# Patient Record
Sex: FEMALE | Race: WHITE | URBAN - METROPOLITAN AREA
[De-identification: names, ages, dates, MRNs, and addresses within clinical notes are randomized per-mention and may not be internally consistent; named-entity substitution may affect disease eponyms.]

---

## 2017-05-05 ENCOUNTER — RECORDS - HEALTHEAST (OUTPATIENT)
Dept: LAB | Facility: CLINIC | Age: 42
End: 2017-05-05

## 2017-05-05 LAB
CHOLEST SERPL-MCNC: 137 MG/DL
FASTING STATUS PATIENT QL REPORTED: ABNORMAL
HDLC SERPL-MCNC: 45 MG/DL
LDLC SERPL CALC-MCNC: 81 MG/DL
TRIGL SERPL-MCNC: 53 MG/DL

## 2018-04-11 ENCOUNTER — TRANSFERRED RECORDS (OUTPATIENT)
Dept: HEALTH INFORMATION MANAGEMENT | Facility: CLINIC | Age: 43
End: 2018-04-11

## 2018-10-08 ENCOUNTER — APPOINTMENT (OUTPATIENT)
Dept: LAB | Facility: CLINIC | Age: 43
End: 2018-10-08
Attending: OBSTETRICS & GYNECOLOGY
Payer: COMMERCIAL

## 2018-10-08 ENCOUNTER — TRANSFERRED RECORDS (OUTPATIENT)
Dept: HEALTH INFORMATION MANAGEMENT | Facility: CLINIC | Age: 43
End: 2018-10-08

## 2018-10-17 ENCOUNTER — MEDICAL CORRESPONDENCE (OUTPATIENT)
Dept: HEALTH INFORMATION MANAGEMENT | Facility: CLINIC | Age: 43
End: 2018-10-17

## 2018-11-02 NOTE — TELEPHONE ENCOUNTER
Date of appointment: 11/09/18   Diagnosis/reason for appointment:Endometrial Hyperplasia  Referring provider/facility:[facility]  Who called:    Recent Studies  Imaging:  Pathology:  Labs:  Previous chemo/radiation (if known):    Records requested from:  Records received from:    Additional information:Getting Images and Path

## 2018-11-06 NOTE — TELEPHONE ENCOUNTER
Fax from [facility] has been sent to HIM for scanning.  Faxed request for slides from [facility] to be sent overnight.  Getting Imaging from [facility].

## 2018-11-08 ASSESSMENT — ENCOUNTER SYMPTOMS
DYSURIA: 0
FLANK PAIN: 0
DIFFICULTY URINATING: 1
DECREASED LIBIDO: 1
HOT FLASHES: 0

## 2018-11-08 NOTE — TELEPHONE ENCOUNTER
I called [facility] on 11/07/18 and left a VM for a call back to obtain images on a mutual patient.    I called today, 11/08/18, and left another  for them to call me as soon as possible for a patient that they see and we are seeing tomorrow, 11/09/18. I left my name and my call back number.

## 2018-11-09 ENCOUNTER — ONCOLOGY VISIT (OUTPATIENT)
Dept: ONCOLOGY | Facility: CLINIC | Age: 43
End: 2018-11-09
Attending: OBSTETRICS & GYNECOLOGY
Payer: COMMERCIAL

## 2018-11-09 ENCOUNTER — APPOINTMENT (OUTPATIENT)
Dept: LAB | Facility: CLINIC | Age: 43
End: 2018-11-09
Attending: OBSTETRICS & GYNECOLOGY
Payer: COMMERCIAL

## 2018-11-09 ENCOUNTER — PRE VISIT (OUTPATIENT)
Dept: ONCOLOGY | Facility: CLINIC | Age: 43
End: 2018-11-09

## 2018-11-09 VITALS
OXYGEN SATURATION: 100 % | SYSTOLIC BLOOD PRESSURE: 130 MMHG | HEART RATE: 82 BPM | RESPIRATION RATE: 16 BRPM | BODY MASS INDEX: 21.79 KG/M2 | WEIGHT: 127.65 LBS | TEMPERATURE: 98.3 F | HEIGHT: 64 IN | DIASTOLIC BLOOD PRESSURE: 77 MMHG

## 2018-11-09 DIAGNOSIS — Z01.818 PRE-OP EXAM: Primary | ICD-10-CM

## 2018-11-09 DIAGNOSIS — Z01.818 PRE-OP EXAM: ICD-10-CM

## 2018-11-09 DIAGNOSIS — N85.02 COMPLEX ATYPICAL ENDOMETRIAL HYPERPLASIA: ICD-10-CM

## 2018-11-09 LAB
ALBUMIN SERPL-MCNC: 4.1 G/DL (ref 3.4–5)
ALP SERPL-CCNC: 41 U/L (ref 40–150)
ALT SERPL W P-5'-P-CCNC: 18 U/L (ref 0–50)
ANION GAP SERPL CALCULATED.3IONS-SCNC: 8 MMOL/L (ref 3–14)
AST SERPL W P-5'-P-CCNC: 15 U/L (ref 0–45)
BASOPHILS # BLD AUTO: 0 10E9/L (ref 0–0.2)
BASOPHILS NFR BLD AUTO: 0.7 %
BILIRUB SERPL-MCNC: 0.4 MG/DL (ref 0.2–1.3)
BUN SERPL-MCNC: 11 MG/DL (ref 7–30)
CALCIUM SERPL-MCNC: 8.6 MG/DL (ref 8.5–10.1)
CHLORIDE SERPL-SCNC: 110 MMOL/L (ref 94–109)
CO2 SERPL-SCNC: 22 MMOL/L (ref 20–32)
CREAT SERPL-MCNC: 0.78 MG/DL (ref 0.52–1.04)
DIFFERENTIAL METHOD BLD: ABNORMAL
EOSINOPHIL # BLD AUTO: 0 10E9/L (ref 0–0.7)
EOSINOPHIL NFR BLD AUTO: 0.7 %
ERYTHROCYTE [DISTWIDTH] IN BLOOD BY AUTOMATED COUNT: 14.2 % (ref 10–15)
GFR SERPL CREATININE-BSD FRML MDRD: 81 ML/MIN/1.7M2
GLUCOSE SERPL-MCNC: 82 MG/DL (ref 70–99)
HCT VFR BLD AUTO: 37.3 % (ref 35–47)
HGB BLD-MCNC: 11.2 G/DL (ref 11.7–15.7)
IMM GRANULOCYTES # BLD: 0 10E9/L (ref 0–0.4)
IMM GRANULOCYTES NFR BLD: 0.4 %
LYMPHOCYTES # BLD AUTO: 1.3 10E9/L (ref 0.8–5.3)
LYMPHOCYTES NFR BLD AUTO: 23.5 %
MCH RBC QN AUTO: 26.5 PG (ref 26.5–33)
MCHC RBC AUTO-ENTMCNC: 30 G/DL (ref 31.5–36.5)
MCV RBC AUTO: 88 FL (ref 78–100)
MONOCYTES # BLD AUTO: 0.4 10E9/L (ref 0–1.3)
MONOCYTES NFR BLD AUTO: 7.9 %
NEUTROPHILS # BLD AUTO: 3.6 10E9/L (ref 1.6–8.3)
NEUTROPHILS NFR BLD AUTO: 66.8 %
NRBC # BLD AUTO: 0 10*3/UL
NRBC BLD AUTO-RTO: 0 /100
PLATELET # BLD AUTO: 231 10E9/L (ref 150–450)
POTASSIUM SERPL-SCNC: 3.9 MMOL/L (ref 3.4–5.3)
PROT SERPL-MCNC: 7.4 G/DL (ref 6.8–8.8)
RBC # BLD AUTO: 4.23 10E12/L (ref 3.8–5.2)
SODIUM SERPL-SCNC: 139 MMOL/L (ref 133–144)
WBC # BLD AUTO: 5.4 10E9/L (ref 4–11)

## 2018-11-09 PROCEDURE — 80053 COMPREHEN METABOLIC PANEL: CPT | Performed by: OBSTETRICS & GYNECOLOGY

## 2018-11-09 PROCEDURE — 85025 COMPLETE CBC W/AUTO DIFF WBC: CPT | Performed by: OBSTETRICS & GYNECOLOGY

## 2018-11-09 PROCEDURE — 36415 COLL VENOUS BLD VENIPUNCTURE: CPT | Performed by: OBSTETRICS & GYNECOLOGY

## 2018-11-09 PROCEDURE — G0463 HOSPITAL OUTPT CLINIC VISIT: HCPCS | Mod: ZF

## 2018-11-09 PROCEDURE — 99205 OFFICE O/P NEW HI 60 MIN: CPT | Mod: ZP | Performed by: OBSTETRICS & GYNECOLOGY

## 2018-11-09 RX ORDER — CEFAZOLIN SODIUM 2 G/50ML
2 SOLUTION INTRAVENOUS
Status: CANCELLED | OUTPATIENT
Start: 2018-11-09

## 2018-11-09 RX ORDER — CEFAZOLIN SODIUM 1 G/50ML
1 INJECTION, SOLUTION INTRAVENOUS SEE ADMIN INSTRUCTIONS
Status: CANCELLED | OUTPATIENT
Start: 2018-11-09

## 2018-11-09 RX ORDER — PHENAZOPYRIDINE HYDROCHLORIDE 200 MG/1
200 TABLET, FILM COATED ORAL ONCE
Status: CANCELLED | OUTPATIENT
Start: 2018-11-09 | End: 2018-11-09

## 2018-11-09 ASSESSMENT — PAIN SCALES - GENERAL: PAINLEVEL: NO PAIN (0)

## 2018-11-09 NOTE — LETTER
2018       RE: Sara Hunter  [address] [place] [address]     Dear Colleague,    Thank you for referring your patient, Sara Hunter, to the [facility]. Please see a copy of my visit note below.                            Consult Notes on Referred Patient    Date: 2018       Dr. Lashell Lopes MD  [facility]  [address]  [place] [address]       RE: Sara Hunter  : 1975  SUMI: 2018    Dear Dr. Lashell Lopes:    I had the pleasure of seeing your patient Sara Hunter here at the Gynecologic Cancer Clinic at the [facility] on 2018.  As you know she is a very pleasant 42 year old woman with a recent diagnosis of  Endometrial hyperplasia.  Given these findings she was subsequently sent to the Gynecologic Cancer Clinic for new patient consultation.     HPI she was recently evaluated by Dr. Lashell Lopes for irregular vaginal bleeding in the setting of knwn fibroids.  HEr symptoms were characterized by menorrhagia and intra-menstrual spotting.  She also has bowel and bledder pressure.      Review of Systems:    See below    Past Medical History:    Past Medical History:   Diagnosis Date     Abnormal Pap smear of cervix      Fibroids          Past Surgical History:    Past Surgical History:   Procedure Laterality Date     HYSTEROSCOPY       LAPAROSCOPY      for ovarian cysts         Health Maintenance:  Health Maintenance Due   Topic Date Due     PHQ-2 Q1 YR  1987     TETANUS IMMUNIZATION (SYSTEM ASSIGNED)  1993     HIV SCREEN (SYSTEM ASSIGNED)  1993     PAP SCREENING Q3 YR (SYSTEM ASSIGNED)  1996     INFLUENZA VACCINE (1) 2018       Last Pap Smear: 10/2018    Last Mammogram: 2016 normal    Last Colonoscopy: never                Current Medications:     currently has no medications in their medication list.       Allergies:        Allergies   Allergen Reactions      "Clindamycin      Rash       Erythromycin      vomiting              Social History:     Social History   Substance Use Topics     Smoking status: Not on file     Smokeless tobacco: Not on file     Alcohol use Not on file       History   Drug Use Not on file             Physical Exam:     PS: 0  VS: /77  Pulse 82  Temp 98.3  F (36.8  C) (Oral)  Resp 16  Ht 1.623 m (5' 3.9\")  Wt 57.9 kg (127 lb 10.3 oz)  SpO2 100%  BMI 21.98 kg/m2   General Appearance: healthy and alert, no distress     HEENT:  no thyromegaly, no palpable nodules or masses        Cardiovascular: regular rate and rhythm, no gallops, rubs or murmurs     Respiratory: lungs clear, no rales, rhonchi or wheezes, normal diaphragmatic excursion    Musculoskeletal: extremities non tender and without edema    Skin: no lesions or rashes     Neurological: normal gait, no gross defects     Psychiatric: appropriate mood and affect                               Hematological: normal cervical, supraclavicular and inguinal lymph nodes     Gastrointestinal:       abdomen soft, non-tender, non-distended, no organomegaly or masses    Genitourinary: External genitalia and urethral meatus appears normal.  Vagina is smooth without nodularity or masses.  Cervix appears normal and without lesions.  Bimanual exam reveal no masses, nodularity or fullness.  Recto-vaginal exam confirms these findings.      Assessment:    Sara Hunter is a 42 year old woman with a new diagnosis of CAH.     A total of 60 minutes was spent with the patient, 40 minutes of which were spent in counseling the patient and/or treatment planning.      Plan:     1.)  CAH: we have discussed the pathologic an clinical findings and she is aware of the potential for occult malignancy.  We have discussed options including medical and surgical management. After a comprehensive discussion of the relative risks and benefits of each strategy she is inclined to surgery, which is reasonable.  I have " discussed the potential risks and benefits of ovarian removal at the time of hysterectomy and she is inclined to TLH with ovarian preservation if no cancer is diagnosed.  She is prepared for oophorectomies if cancer is diagnosed, with staging as indicated by the intra-operative findings.     2.) Genetic risk factors were assessed and the patient does not meet the qualifications for a referral.      3.) Labs and/or tests ordered include:  Pre-op labs.     4.) Health maintenance issues addressed today include none.    5.) Pre-op teaching was completed today.  Risks of surgery were discussed to include: bleeding, transfusion, infection, unintentional injury to surrounding organs/structures.      Thank you for allowing us to participate in the care of your patient.         Sincerely,    Isidro CIFUENTES  Patient Care Team:  No Ref-Primary, Physician as PCP - ARABELLA Wilkinson

## 2018-11-09 NOTE — NURSING NOTE
"Oncology Rooming Note    November 9, 2018 9:45 AM   Sara Hunter is a 42 year old female who presents for:    Chief Complaint   Patient presents with     Oncology Clinic Visit     New Consult for Endometrial Hyperplasia , surgery      Initial Vitals: /77  Pulse 82  Temp 98.3  F (36.8  C) (Oral)  Resp 16  Ht 1.623 m (5' 3.9\")  Wt 57.9 kg (127 lb 10.3 oz)  SpO2 100%  BMI 21.98 kg/m2 Estimated body mass index is 21.98 kg/(m^2) as calculated from the following:    Height as of this encounter: 1.623 m (5' 3.9\").    Weight as of this encounter: 57.9 kg (127 lb 10.3 oz). Body surface area is 1.62 meters squared.  No Pain (0) Comment: Data Unavailable   No LMP recorded.  Allergies reviewed: Yes  Medications reviewed: Yes    Medications: Medication refills not needed today.  Pharmacy name entered into EPIC: Data Unavailable    Clinical concerns: discuss surgery Steve  was notified.    8  minutes for nursing intake (face to face time)     Vani Estrella MA              "

## 2018-11-09 NOTE — PROGRESS NOTES
Consult Notes on Referred Patient    Date: 2018       Dr. Lashell Lopes MD  [facility]  [address] [id number]       RE: Sara Hunter  : 1975  SUMI: 2018    Dear Dr. Lashell Lopes:    I had the pleasure of seeing your patient Sara Hunter here at the Gynecologic Cancer Clinic at the [facility] on 2018.  As you know she is a very pleasant 42 year old woman with a recent diagnosis of  Endometrial hyperplasia.  Given these findings she was subsequently sent to the Gynecologic Cancer Clinic for new patient consultation.     HPI she was recently evaluated by Dr. Lashell Lopes for irregular vaginal bleeding in the setting of knwn fibroids.  HEr symptoms were characterized by menorrhagia and intra-menstrual spotting.  She also has bowel and bledder pressure.      Review of Systems:    See below    Past Medical History:    Past Medical History:   Diagnosis Date     Abnormal Pap smear of cervix      Fibroids          Past Surgical History:    Past Surgical History:   Procedure Laterality Date     HYSTEROSCOPY       LAPAROSCOPY      for ovarian cysts         Health Maintenance:  Health Maintenance Due   Topic Date Due     PHQ-2 Q1 YR  1987     TETANUS IMMUNIZATION (SYSTEM ASSIGNED)  1993     HIV SCREEN (SYSTEM ASSIGNED)  1993     PAP SCREENING Q3 YR (SYSTEM ASSIGNED)  1996     INFLUENZA VACCINE (1) 2018       Last Pap Smear: 10/2018    Last Mammogram: 2016 normal    Last Colonoscopy: never                Current Medications:     currently has no medications in their medication list.       Allergies:        Allergies   Allergen Reactions     Clindamycin      Rash       Erythromycin      vomiting              Social History:     Social History   Substance Use Topics     Smoking status: Not on file     Smokeless tobacco: Not on file     Alcohol use Not on file       History   Drug  "Use Not on file             Physical Exam:     PS: 0  VS: /77  Pulse 82  Temp 98.3  F (36.8  C) (Oral)  Resp 16  Ht 1.623 m (5' 3.9\")  Wt 57.9 kg (127 lb 10.3 oz)  SpO2 100%  BMI 21.98 kg/m2   General Appearance: healthy and alert, no distress     HEENT:  no thyromegaly, no palpable nodules or masses        Cardiovascular: regular rate and rhythm, no gallops, rubs or murmurs     Respiratory: lungs clear, no rales, rhonchi or wheezes, normal diaphragmatic excursion    Musculoskeletal: extremities non tender and without edema    Skin: no lesions or rashes     Neurological: normal gait, no gross defects     Psychiatric: appropriate mood and affect                               Hematological: normal cervical, supraclavicular and inguinal lymph nodes     Gastrointestinal:       abdomen soft, non-tender, non-distended, no organomegaly or masses    Genitourinary: External genitalia and urethral meatus appears normal.  Vagina is smooth without nodularity or masses.  Cervix appears normal and without lesions.  Bimanual exam reveal no masses, nodularity or fullness.  Recto-vaginal exam confirms these findings.      Assessment:    Sara Hunter is a 42 year old woman with a new diagnosis of CAH.     A total of 60 minutes was spent with the patient, 40 minutes of which were spent in counseling the patient and/or treatment planning.      Plan:     1.)  CAH: we have discussed the pathologic an clinical findings and she is aware of the potential for occult malignancy.  We have discussed options including medical and surgical management. After a comprehensive discussion of the relative risks and benefits of each strategy she is inclined to surgery, which is reasonable.  I have discussed the potential risks and benefits of ovarian removal at the time of hysterectomy and she is inclined to TLH with ovarian preservation if no cancer is diagnosed.  She is prepared for oophorectomies if cancer is diagnosed, with staging as " indicated by the intra-operative findings.     2.) Genetic risk factors were assessed and the patient does not meet the qualifications for a referral.      3.) Labs and/or tests ordered include:  Pre-op labs.     4.) Health maintenance issues addressed today include none.    5.) Pre-op teaching was completed today.  Risks of surgery were discussed to include: bleeding, transfusion, infection, unintentional injury to surrounding organs/structures.      Thank you for allowing us to participate in the care of your patient.         Sincerely,    Isidro Wilson    CC  Patient Care Team:  No Ref-Primary, Physician as PCP - ARABELLA Wilkinson    Answers for HPI/ROS submitted by the patient on 11/8/2018   General Symptoms: No  Skin Symptoms: No  HENT Symptoms: No  EYE SYMPTOMS: No  HEART SYMPTOMS: No  LUNG SYMPTOMS: No  INTESTINAL SYMPTOMS: No  URINARY SYMPTOMS: Yes  GYNECOLOGIC SYMPTOMS: Yes  BREAST SYMPTOMS: No  SKELETAL SYMPTOMS: No  BLOOD SYMPTOMS: No  NERVOUS SYSTEM SYMPTOMS: No  MENTAL HEALTH SYMPTOMS: No  Trouble holding urine or incontinence: No  Pain or burning: No  Trouble starting or stopping: No  Increased frequency of urination: No  Frequent nighttime urination: Yes  Flank pain: No  Difficulty emptying bladder: Yes  Bleeding or spotting between periods: Yes  Heavy or painful periods: Yes  Irregular periods: No  Vaginal discharge: Yes  Hot flashes: No  Vaginal dryness: No  Genital ulcers: No  Reduced libido: Yes  Painful intercourse: Yes  Difficulty with sexual arousal: No  Post-menopausal bleeding: No

## 2018-11-09 NOTE — MR AVS SNAPSHOT
"              After Visit Summary   11/9/2018    Sara Hunter    MRN: [id number]           Patient Information     Date Of Birth          1975        Visit Information        Provider Department      11/9/2018 9:00 AM Isidro Wilson MD [facility]        Today's Diagnoses     Pre-op exam    -  1       Follow-ups after your visit        Who to contact     If you have questions or need follow up information about today's clinic visit or your schedule please contact [facility] directly at [phone].  Normal or non-critical lab and imaging results will be communicated to you by [employer], letter or phone within 4 business days after the clinic has received the results. If you do not hear from us within 7 days, please contact the clinic through [employer] or phone. If you have a critical or abnormal lab result, we will notify you by phone as soon as possible.  Submit refill requests through [employer] or call your pharmacy and they will forward the refill request to us. Please allow 3 business days for your refill to be completed.          Additional Information About Your Visit        MyChart Information     [employer] gives you secure access to your electronic health record. If you see a primary care provider, you can also send messages to your care team and make appointments. If you have questions, please call your primary care clinic.  If you do not have a primary care provider, please call [phone] and they will assist you.        Care EveryWhere ID     This is your Care EveryWhere ID. This could be used by other organizations to access your [place] medical records  [id number]        Your Vitals Were     Pulse Temperature Respirations Height Pulse Oximetry BMI (Body Mass Index)    82 98.3  F (36.8  C) (Oral) 16 1.623 m (5' 3.9\") 100% 21.98 kg/m2       Blood Pressure from Last 3 Encounters:   11/09/18 130/77    Weight from Last 3 Encounters:   11/09/18 57.9 kg " (127 lb 10.3 oz)               Primary Care Provider Fax #    Physician No Ref-Primary [phone]       No address on file        Equal Access to Services     SG AGUILAR : Hadii aad ku hadamena Bright, dilan malikasridevi, rio desai, juany sarkar. So [facility] [phone].    ATENCIÓN: Si habla español, tiene a gonzalez disposición servicios gratuitos de asistencia lingüística. Llame al [phone].    We comply with applicable federal civil rights laws and [place] laws. We do not discriminate on the basis of race, color, national origin, age, disability, sex, sexual orientation, or gender identity.            Thank you!     Thank you for choosing [facility]  for your care. Our goal is always to provide you with excellent care. Hearing back from our patients is one way we can continue to improve our services. Please take a few minutes to complete the written survey that you may receive in the mail after your visit with us. Thank you!             Your Updated Medication List - Protect others around you: Learn how to safely use, store and throw away your medicines at www.disposemymeds.org.          This list is accurate as of 11/9/18 12:50 PM.  Always use your most recent med list.                   Brand Name Dispense Instructions for use Diagnosis    ALEVE PO      Take by mouth as needed for moderate pain        TYLENOL PO      Take 500 mg by mouth every 4 hours as needed for mild pain or fever

## 2018-11-13 ENCOUNTER — HOSPITAL ENCOUNTER (OUTPATIENT)
Facility: CLINIC | Age: 43
Setting detail: SPECIMEN
Discharge: HOME | End: 2018-11-13
Admitting: OBSTETRICS & GYNECOLOGY
Payer: COMMERCIAL

## 2018-11-13 PROCEDURE — 88360 TUMOR IMMUNOHISTOCHEM/MANUAL: CPT | Performed by: OBSTETRICS & GYNECOLOGY

## 2018-11-13 PROCEDURE — 88323 CONSLTJ&REPRT MATRL PREP SLD: CPT | Performed by: OBSTETRICS & GYNECOLOGY

## 2018-11-13 PROCEDURE — 00000346 ZZHCL STATISTIC REVIEW OUTSIDE SLIDES TC 88321: Performed by: OBSTETRICS & GYNECOLOGY

## 2018-11-13 NOTE — NURSING NOTE
Pre Op Nurse Teaching Template    Relevant Diagnosis: endometrial hyperplasia    Teaching Topic: laparoscopic removal of uterus tubes ovaries cervix possible open abdomen and cancer operation    Person(s) involved in teaching :  Patient    Motivation Level:  Asks Questions:    Yes      Eager to Learn:     Yes     Cooperative:          Yes    Receptive (willing. Able to accept information):    Yes      Patient and those who are listed above demonstrates understanding of the following:   Reason for the appointment, diagnosis and treatment plan:   Yes   Knowledge of proper use of medications and conditions for which they are ordered (with special attention to potential side effects or drug interactions): Yes   Which situations necessitate calling provider and whom to contact: Yes         Nutritional needs and diet plan:  Yes      Pain management techniques:     Yes, Pain Scale   Diet:   Yes, [facility] Diet Instructions    Teaching Concerns addressed: Yes    Infection Prevention:  Patient and those who are listed above demonstrate understanding of the following:  Pre-Op CHG Bathing Instructions: Yes  Surgical procedure site care taught:   Yes   Signs and symptoms of infection taught: Yes       Instructional Materials Used/Given:  The [facility] Before You Surgery Booklet  Showering or Bathing before Surgery Instructions & CHG Product  Hysterectomy Guidelines  Pain Assessment Tool   Home Care after Major Abdominal or Vaginal Surgery  Map  Accommodations Brochure  Phone numbers for [facility] and [facility]  Copy of Surgical Consent    Done Today:  Lab work,   Preop Visit not needed   Tests Ordered:  Post Op Visit Scheduled:tbd  Comments:    Surgery date/time:11/21/18    Consent to file in medical records  .

## 2018-11-14 ENCOUNTER — HEALTH MAINTENANCE LETTER (OUTPATIENT)
Age: 43
End: 2018-11-14

## 2018-11-14 NOTE — NURSING NOTE
Surgery scheduled on 11/21/18  Lab work to be done today-orders placed-appt scheduled  Post op appt scheduled 12/21/18

## 2018-11-20 ENCOUNTER — ANESTHESIA EVENT (OUTPATIENT)
Dept: SURGERY | Facility: CLINIC | Age: 43
End: 2018-11-20
Payer: COMMERCIAL

## 2018-11-21 ENCOUNTER — HOSPITAL ENCOUNTER (OUTPATIENT)
Facility: CLINIC | Age: 43
Discharge: HOME | End: 2018-11-21
Attending: OBSTETRICS & GYNECOLOGY | Admitting: OBSTETRICS & GYNECOLOGY
Payer: COMMERCIAL

## 2018-11-21 ENCOUNTER — SURGERY (OUTPATIENT)
Age: 43
End: 2018-11-21

## 2018-11-21 ENCOUNTER — ANESTHESIA (OUTPATIENT)
Dept: SURGERY | Facility: CLINIC | Age: 43
End: 2018-11-21
Payer: COMMERCIAL

## 2018-11-21 VITALS
WEIGHT: 123.9 LBS | RESPIRATION RATE: 18 BRPM | BODY MASS INDEX: 21.95 KG/M2 | DIASTOLIC BLOOD PRESSURE: 66 MMHG | OXYGEN SATURATION: 93 % | TEMPERATURE: 98.8 F | SYSTOLIC BLOOD PRESSURE: 118 MMHG | HEIGHT: 63 IN

## 2018-11-21 DIAGNOSIS — N85.02 COMPLEX ATYPICAL ENDOMETRIAL HYPERPLASIA: ICD-10-CM

## 2018-11-21 DIAGNOSIS — K59.03 CONSTIPATION DUE TO OPIOID THERAPY: ICD-10-CM

## 2018-11-21 DIAGNOSIS — T40.2X5A CONSTIPATION DUE TO OPIOID THERAPY: ICD-10-CM

## 2018-11-21 DIAGNOSIS — Z90.710 S/P LAPAROSCOPIC HYSTERECTOMY: Primary | ICD-10-CM

## 2018-11-21 LAB
ABO + RH BLD: NORMAL
ABO + RH BLD: NORMAL
B-HCG SERPL-ACNC: <1 IU/L (ref 0–5)
BLD GP AB SCN SERPL QL: NORMAL
BLOOD BANK CMNT PATIENT-IMP: NORMAL
GLUCOSE BLDC GLUCOMTR-MCNC: 102 MG/DL (ref 70–99)
SPECIMEN EXP DATE BLD: NORMAL

## 2018-11-21 PROCEDURE — 27210794 ZZH OR GENERAL SUPPLY STERILE: Performed by: OBSTETRICS & GYNECOLOGY

## 2018-11-21 PROCEDURE — 88309 TISSUE EXAM BY PATHOLOGIST: CPT | Performed by: OBSTETRICS & GYNECOLOGY

## 2018-11-21 PROCEDURE — 71000014 ZZH RECOVERY PHASE 1 LEVEL 2 FIRST HR: Performed by: OBSTETRICS & GYNECOLOGY

## 2018-11-21 PROCEDURE — 36000062 ZZH SURGERY LEVEL 4 1ST 30 MIN - UMMC: Performed by: OBSTETRICS & GYNECOLOGY

## 2018-11-21 PROCEDURE — 86900 BLOOD TYPING SEROLOGIC ABO: CPT | Performed by: ANESTHESIOLOGY

## 2018-11-21 PROCEDURE — 58571 TLH W/T/O 250 G OR LESS: CPT | Mod: GC | Performed by: OBSTETRICS & GYNECOLOGY

## 2018-11-21 PROCEDURE — 00000155 ZZHCL STATISTIC H-CELL BLOCK W/STAIN: Performed by: OBSTETRICS & GYNECOLOGY

## 2018-11-21 PROCEDURE — 71000015 ZZH RECOVERY PHASE 1 LEVEL 2 EA ADDTL HR: Performed by: OBSTETRICS & GYNECOLOGY

## 2018-11-21 PROCEDURE — 86850 RBC ANTIBODY SCREEN: CPT | Performed by: ANESTHESIOLOGY

## 2018-11-21 PROCEDURE — 40000170 ZZH STATISTIC PRE-PROCEDURE ASSESSMENT II: Performed by: OBSTETRICS & GYNECOLOGY

## 2018-11-21 PROCEDURE — 86901 BLOOD TYPING SEROLOGIC RH(D): CPT | Performed by: ANESTHESIOLOGY

## 2018-11-21 PROCEDURE — 25000132 ZZH RX MED GY IP 250 OP 250 PS 637: Performed by: OBSTETRICS & GYNECOLOGY

## 2018-11-21 PROCEDURE — 25000128 H RX IP 250 OP 636: Performed by: OBSTETRICS & GYNECOLOGY

## 2018-11-21 PROCEDURE — 37000009 ZZH ANESTHESIA TECHNICAL FEE, EACH ADDTL 15 MIN: Performed by: OBSTETRICS & GYNECOLOGY

## 2018-11-21 PROCEDURE — 88342 IMHCHEM/IMCYTCHM 1ST ANTB: CPT | Performed by: OBSTETRICS & GYNECOLOGY

## 2018-11-21 PROCEDURE — 71000027 ZZH RECOVERY PHASE 2 EACH 15 MINS: Performed by: OBSTETRICS & GYNECOLOGY

## 2018-11-21 PROCEDURE — 25000132 ZZH RX MED GY IP 250 OP 250 PS 637: Performed by: NURSE ANESTHETIST, CERTIFIED REGISTERED

## 2018-11-21 PROCEDURE — 25000128 H RX IP 250 OP 636: Performed by: NURSE ANESTHETIST, CERTIFIED REGISTERED

## 2018-11-21 PROCEDURE — 37000008 ZZH ANESTHESIA TECHNICAL FEE, 1ST 30 MIN: Performed by: OBSTETRICS & GYNECOLOGY

## 2018-11-21 PROCEDURE — 25000566 ZZH SEVOFLURANE, EA 15 MIN: Performed by: OBSTETRICS & GYNECOLOGY

## 2018-11-21 PROCEDURE — 25000125 ZZHC RX 250: Performed by: NURSE ANESTHETIST, CERTIFIED REGISTERED

## 2018-11-21 PROCEDURE — 88331 PATH CONSLTJ SURG 1 BLK 1SPC: CPT | Performed by: OBSTETRICS & GYNECOLOGY

## 2018-11-21 PROCEDURE — 84702 CHORIONIC GONADOTROPIN TEST: CPT | Performed by: OBSTETRICS & GYNECOLOGY

## 2018-11-21 PROCEDURE — 36415 COLL VENOUS BLD VENIPUNCTURE: CPT | Performed by: OBSTETRICS & GYNECOLOGY

## 2018-11-21 PROCEDURE — 36000064 ZZH SURGERY LEVEL 4 EA 15 ADDTL MIN - UMMC: Performed by: OBSTETRICS & GYNECOLOGY

## 2018-11-21 PROCEDURE — 88112 CYTOPATH CELL ENHANCE TECH: CPT | Performed by: OBSTETRICS & GYNECOLOGY

## 2018-11-21 PROCEDURE — 25000128 H RX IP 250 OP 636: Performed by: ANESTHESIOLOGY

## 2018-11-21 PROCEDURE — 82962 GLUCOSE BLOOD TEST: CPT

## 2018-11-21 PROCEDURE — 88305 TISSUE EXAM BY PATHOLOGIST: CPT | Performed by: OBSTETRICS & GYNECOLOGY

## 2018-11-21 RX ORDER — DEXAMETHASONE SODIUM PHOSPHATE 10 MG/ML
INJECTION, SOLUTION INTRAMUSCULAR; INTRAVENOUS PRN
Status: DISCONTINUED | OUTPATIENT
Start: 2018-11-21 | End: 2018-11-21

## 2018-11-21 RX ORDER — LIDOCAINE HYDROCHLORIDE 20 MG/ML
INJECTION, SOLUTION INFILTRATION; PERINEURAL PRN
Status: DISCONTINUED | OUTPATIENT
Start: 2018-11-21 | End: 2018-11-21

## 2018-11-21 RX ORDER — MEPERIDINE HYDROCHLORIDE 50 MG/ML
12.5 INJECTION INTRAMUSCULAR; INTRAVENOUS; SUBCUTANEOUS
Status: DISCONTINUED | OUTPATIENT
Start: 2018-11-21 | End: 2018-11-21 | Stop reason: HOSPADM

## 2018-11-21 RX ORDER — FENTANYL CITRATE 50 UG/ML
25-50 INJECTION, SOLUTION INTRAMUSCULAR; INTRAVENOUS
Status: DISCONTINUED | OUTPATIENT
Start: 2018-11-21 | End: 2018-11-21 | Stop reason: HOSPADM

## 2018-11-21 RX ORDER — OXYCODONE HYDROCHLORIDE 5 MG/1
5 TABLET ORAL EVERY 6 HOURS PRN
Qty: 15 TABLET | Refills: 0 | Status: SHIPPED | OUTPATIENT
Start: 2018-11-21 | End: 2018-12-10

## 2018-11-21 RX ORDER — SODIUM CHLORIDE, SODIUM LACTATE, POTASSIUM CHLORIDE, CALCIUM CHLORIDE 600; 310; 30; 20 MG/100ML; MG/100ML; MG/100ML; MG/100ML
INJECTION, SOLUTION INTRAVENOUS CONTINUOUS
Status: DISCONTINUED | OUTPATIENT
Start: 2018-11-21 | End: 2018-11-21 | Stop reason: HOSPADM

## 2018-11-21 RX ORDER — BUPIVACAINE HYDROCHLORIDE 2.5 MG/ML
INJECTION, SOLUTION EPIDURAL; INFILTRATION; INTRACAUDAL PRN
Status: DISCONTINUED | OUTPATIENT
Start: 2018-11-21 | End: 2018-11-21 | Stop reason: HOSPADM

## 2018-11-21 RX ORDER — ACETAMINOPHEN 325 MG/1
650 TABLET ORAL
Status: DISCONTINUED | OUTPATIENT
Start: 2018-11-21 | End: 2018-11-21 | Stop reason: HOSPADM

## 2018-11-21 RX ORDER — FLUMAZENIL 0.1 MG/ML
0.2 INJECTION, SOLUTION INTRAVENOUS
Status: DISCONTINUED | OUTPATIENT
Start: 2018-11-21 | End: 2018-11-21 | Stop reason: HOSPADM

## 2018-11-21 RX ORDER — IBUPROFEN 600 MG/1
600 TABLET, FILM COATED ORAL EVERY 6 HOURS PRN
Qty: 60 TABLET | Refills: 0 | Status: SHIPPED | OUTPATIENT
Start: 2018-11-21 | End: 2018-12-10

## 2018-11-21 RX ORDER — OXYCODONE HYDROCHLORIDE 5 MG/1
5 TABLET ORAL
Status: DISCONTINUED | OUTPATIENT
Start: 2018-11-21 | End: 2018-11-21 | Stop reason: HOSPADM

## 2018-11-21 RX ORDER — PROPOFOL 10 MG/ML
INJECTION, EMULSION INTRAVENOUS PRN
Status: DISCONTINUED | OUTPATIENT
Start: 2018-11-21 | End: 2018-11-21

## 2018-11-21 RX ORDER — CEFAZOLIN SODIUM 2 G/100ML
2 INJECTION, SOLUTION INTRAVENOUS
Status: COMPLETED | OUTPATIENT
Start: 2018-11-21 | End: 2018-11-21

## 2018-11-21 RX ORDER — NALOXONE HYDROCHLORIDE 0.4 MG/ML
.1-.4 INJECTION, SOLUTION INTRAMUSCULAR; INTRAVENOUS; SUBCUTANEOUS
Status: DISCONTINUED | OUTPATIENT
Start: 2018-11-21 | End: 2018-11-21 | Stop reason: HOSPADM

## 2018-11-21 RX ORDER — KETOROLAC TROMETHAMINE 30 MG/ML
INJECTION, SOLUTION INTRAMUSCULAR; INTRAVENOUS PRN
Status: DISCONTINUED | OUTPATIENT
Start: 2018-11-21 | End: 2018-11-21

## 2018-11-21 RX ORDER — ONDANSETRON 2 MG/ML
4 INJECTION INTRAMUSCULAR; INTRAVENOUS EVERY 30 MIN PRN
Status: DISCONTINUED | OUTPATIENT
Start: 2018-11-21 | End: 2018-11-21 | Stop reason: HOSPADM

## 2018-11-21 RX ORDER — FENTANYL CITRATE 50 UG/ML
INJECTION, SOLUTION INTRAMUSCULAR; INTRAVENOUS PRN
Status: DISCONTINUED | OUTPATIENT
Start: 2018-11-21 | End: 2018-11-21

## 2018-11-21 RX ORDER — LIDOCAINE 40 MG/G
CREAM TOPICAL
Status: DISCONTINUED | OUTPATIENT
Start: 2018-11-21 | End: 2018-11-21 | Stop reason: HOSPADM

## 2018-11-21 RX ORDER — CEFAZOLIN SODIUM 1 G/3ML
1 INJECTION, POWDER, FOR SOLUTION INTRAMUSCULAR; INTRAVENOUS SEE ADMIN INSTRUCTIONS
Status: DISCONTINUED | OUTPATIENT
Start: 2018-11-21 | End: 2018-11-21 | Stop reason: HOSPADM

## 2018-11-21 RX ORDER — AMOXICILLIN 250 MG
1-2 CAPSULE ORAL 2 TIMES DAILY
Qty: 60 TABLET | Refills: 0 | Status: SHIPPED | OUTPATIENT
Start: 2018-11-21 | End: 2018-12-10

## 2018-11-21 RX ORDER — ONDANSETRON 4 MG/1
4 TABLET, ORALLY DISINTEGRATING ORAL EVERY 30 MIN PRN
Status: DISCONTINUED | OUTPATIENT
Start: 2018-11-21 | End: 2018-11-21 | Stop reason: HOSPADM

## 2018-11-21 RX ORDER — ONDANSETRON 2 MG/ML
INJECTION INTRAMUSCULAR; INTRAVENOUS PRN
Status: DISCONTINUED | OUTPATIENT
Start: 2018-11-21 | End: 2018-11-21

## 2018-11-21 RX ORDER — PHENAZOPYRIDINE HYDROCHLORIDE 200 MG/1
200 TABLET, FILM COATED ORAL ONCE
Status: COMPLETED | OUTPATIENT
Start: 2018-11-21 | End: 2018-11-21

## 2018-11-21 RX ORDER — ACETAMINOPHEN 325 MG/1
650 TABLET ORAL EVERY 6 HOURS PRN
Qty: 60 TABLET | Refills: 0 | Status: SHIPPED | OUTPATIENT
Start: 2018-11-21 | End: 2018-12-10

## 2018-11-21 RX ORDER — HYDROMORPHONE HYDROCHLORIDE 1 MG/ML
.3-.5 INJECTION, SOLUTION INTRAMUSCULAR; INTRAVENOUS; SUBCUTANEOUS EVERY 10 MIN PRN
Status: DISCONTINUED | OUTPATIENT
Start: 2018-11-21 | End: 2018-11-21 | Stop reason: HOSPADM

## 2018-11-21 RX ADMIN — DEXAMETHASONE SODIUM PHOSPHATE 6 MG: 10 INJECTION, SOLUTION INTRAMUSCULAR; INTRAVENOUS at 07:45

## 2018-11-21 RX ADMIN — FENTANYL CITRATE 25 MCG: 50 INJECTION, SOLUTION INTRAMUSCULAR; INTRAVENOUS at 10:10

## 2018-11-21 RX ADMIN — SODIUM CHLORIDE, POTASSIUM CHLORIDE, SODIUM LACTATE AND CALCIUM CHLORIDE: 600; 310; 30; 20 INJECTION, SOLUTION INTRAVENOUS at 07:27

## 2018-11-21 RX ADMIN — BUPIVACAINE HYDROCHLORIDE 10 ML: 2.5 INJECTION, SOLUTION EPIDURAL; INFILTRATION; INTRACAUDAL; PERINEURAL at 09:48

## 2018-11-21 RX ADMIN — ROCURONIUM BROMIDE 50 MG: 10 INJECTION INTRAVENOUS at 07:43

## 2018-11-21 RX ADMIN — MIDAZOLAM 2 MG: 1 INJECTION INTRAMUSCULAR; INTRAVENOUS at 07:27

## 2018-11-21 RX ADMIN — FENTANYL CITRATE 50 MCG: 50 INJECTION, SOLUTION INTRAMUSCULAR; INTRAVENOUS at 08:05

## 2018-11-21 RX ADMIN — FENTANYL CITRATE 100 MCG: 50 INJECTION, SOLUTION INTRAMUSCULAR; INTRAVENOUS at 07:35

## 2018-11-21 RX ADMIN — CEFAZOLIN 1 G: 1 INJECTION, POWDER, FOR SOLUTION INTRAMUSCULAR; INTRAVENOUS at 09:45

## 2018-11-21 RX ADMIN — ONDANSETRON 4 MG: 2 INJECTION INTRAMUSCULAR; INTRAVENOUS at 07:45

## 2018-11-21 RX ADMIN — POLYETHYLENE GLYCOL 400 AND PROPYLENE GLYCOL 3 DROP: 4; 3 SOLUTION/ DROPS OPHTHALMIC at 07:43

## 2018-11-21 RX ADMIN — PROPOFOL 120 MG: 10 INJECTION, EMULSION INTRAVENOUS at 07:41

## 2018-11-21 RX ADMIN — FENTANYL CITRATE 50 MCG: 50 INJECTION, SOLUTION INTRAMUSCULAR; INTRAVENOUS at 07:40

## 2018-11-21 RX ADMIN — SUGAMMADEX 120 MG: 100 INJECTION, SOLUTION INTRAVENOUS at 09:41

## 2018-11-21 RX ADMIN — CEFAZOLIN SODIUM 2 G: 2 INJECTION, SOLUTION INTRAVENOUS at 07:45

## 2018-11-21 RX ADMIN — LIDOCAINE HYDROCHLORIDE 80 MG: 20 INJECTION, SOLUTION INFILTRATION; PERINEURAL at 07:40

## 2018-11-21 RX ADMIN — PHENAZOPYRIDINE HYDROCHLORIDE 200 MG: 200 TABLET, FILM COATED ORAL at 06:08

## 2018-11-21 RX ADMIN — KETOROLAC TROMETHAMINE 30 MG: 30 INJECTION, SOLUTION INTRAMUSCULAR at 09:38

## 2018-11-21 RX ADMIN — ROCURONIUM BROMIDE 20 MG: 10 INJECTION INTRAVENOUS at 08:33

## 2018-11-21 NOTE — DISCHARGE INSTRUCTIONS
[facility]  Same-Day Surgery   Adult Discharge Orders & Instructions     For 24 hours after surgery    1. Get plenty of rest.  A responsible adult must stay with you for at least 24 hours after you leave the hospital.   2. Do not drive or use heavy equipment.  If you have weakness or tingling, don't drive or use heavy equipment until this feeling goes away.  3. Do not drink alcohol.  4. Avoid strenuous or risky activities.  Ask for help when climbing stairs.   5. You may feel lightheaded.  IF so, sit for a few minutes before standing.  Have someone help you get up.   6. If you have nausea (feel sick to your stomach): Drink only clear liquids such as apple juice, ginger ale, broth or 7-Up.  Rest may also help.  Be sure to drink enough fluids.  Move to a regular diet as you feel able.  7. You may have a slight fever. Call the doctor if your fever is over 100 F (37.7 C) (taken under the tongue) or lasts longer than 24 hours.  8. You may have a dry mouth, a sore throat, muscle aches or trouble sleeping.  These should go away after 24 hours.  9. Do not make important or legal decisions.   Call your doctor for any of the followin.  Signs of infection (fever, growing tenderness at the surgery site, a large amount of drainage or bleeding, severe pain, foul-smelling drainage, redness, swelling).    2. It has been over 8 to 10 hours since surgery and you are still not able to urinate (pass water).    3.  Headache for over 24 hours.    To contact a doctor, call Dr Wilson's office at [phone] at the Women's Health/Gynecologic Clinic or:        [phone] and ask for the resident on call for OB/GYN (answered 24 hours a day)      Emergency Department:    [facility]: [phone]       (TTY for hearing impaired: [phone])    [facility]: [phone]       (TTY for hearing impaired: [phone])

## 2018-11-21 NOTE — ANESTHESIA PREPROCEDURE EVALUATION
Anesthesia Pre-Procedure Evaluation    Patient: Sara Hunter   MRN:     [id number] Gender:   female   Age:    43 year old :      1975        Preoperative Diagnosis: Complex Atypical Endometrial Hyperplasia    Procedure(s):  Laparoscopic Total Hysterectomy, Bilateral Salpingo-Oophorectomy  Possible Open Total Abdominal Hysterectomy, Bilateral Salpingo-Oophorectomy, Lymph Node Dissection     Past Medical History:   Diagnosis Date     Abnormal Pap smear of cervix      Fibroids       Past Surgical History:   Procedure Laterality Date     COLONOSCOPY       GYN SURGERY      lap      HYSTEROSCOPY       LAPAROSCOPY      for ovarian cysts          Anesthesia Evaluation     . Pt has had prior anesthetic. Type: General    No history of anesthetic complications          ROS/MED HX    ENT/Pulmonary:  - neg pulmonary ROS     Neurologic:  - neg neurologic ROS     Cardiovascular:  - neg cardiovascular ROS       METS/Exercise Tolerance:  >4 METS   Hematologic:  - neg hematologic  ROS       Musculoskeletal:  - neg musculoskeletal ROS       GI/Hepatic:  - neg GI/hepatic ROS       Renal/Genitourinary:  - ROS Renal section negative       Endo:  - neg endo ROS       Psychiatric:  - neg psychiatric ROS       Infectious Disease:         Malignancy:   (+)   Endometrial hyperplasia        Other:    (+) No chance of pregnancy no H/O Chronic Pain,                       PHYSICAL EXAM:   Mental Status/Neuro: A/A/O   Airway: Facies: Feasible  Mallampati: Not Assessed  Mouth/Opening: Not Assessed  TM distance: Not Assessed  Neck ROM: Not Assessed   Respiratory: Auscultation: CTAB     Resp. Rate: Normal     Resp. Effort: Normal      CV: Rhythm: Regular  Heart: Normal Sounds   Comments:      Dental: Normal                  Lab Results   Component Value Date    WBC 5.4 2018    HGB 11.2 (L) 2018    HCT 37.3 2018     2018     2018    POTASSIUM 3.9 2018    CHLORIDE 110 (H) 2018    CO2 22  "11/09/2018    BUN 11 11/09/2018    CR 0.78 11/09/2018    GLC 82 11/09/2018    ANNABELLE 8.6 11/09/2018    ALBUMIN 4.1 11/09/2018    PROTTOTAL 7.4 11/09/2018    ALT 18 11/09/2018    AST 15 11/09/2018    ALKPHOS 41 11/09/2018    BILITOTAL 0.4 11/09/2018       Preop Vitals  BP Readings from Last 3 Encounters:   11/21/18 (!) 136/93   11/09/18 130/77    Pulse Readings from Last 3 Encounters:   11/09/18 82      Resp Readings from Last 3 Encounters:   11/21/18 16   11/09/18 16    SpO2 Readings from Last 3 Encounters:   11/21/18 100%   11/09/18 100%      Temp Readings from Last 1 Encounters:   11/21/18 36.9  C (98.4  F) (Oral)    Ht Readings from Last 1 Encounters:   11/21/18 1.6 m (5' 2.99\")      Wt Readings from Last 1 Encounters:   11/21/18 56.2 kg (123 lb 14.4 oz)    Estimated body mass index is 21.95 kg/(m^2) as calculated from the following:    Height as of this encounter: 1.6 m (5' 2.99\").    Weight as of this encounter: 56.2 kg (123 lb 14.4 oz).     LDA:  ETT (adult) (Active)   Number of days:0            Assessment:   ASA SCORE: 1    NPO Status: > 6 hours since completed Solid [employer]   Documentation: H&P complete; Preop Testing complete; Consents complete   Proceeding: Proceed without further delay  Tobacco Use:  NO Active use of Tobacco/UNKNOWN Tobacco use status     Plan:   Anes. Type:  General   Pre-Induction: Midazolam IV   Induction:  IV (Standard)   Airway: Oral ETT   Access/Monitoring: PIV   Maintenance: Balanced   Emergence: Procedure Site   Logistics: Same Day Surgery     Postop Pain/Sedation Strategy:  Standard-Options: Opioids PRN     PONV Management:  Adult Risk Factors: Female, Non-Smoker, Postop Opioids  Prevention: Ondansetron; Dexamethasone     CONSENT: Direct conversation   Plan and risks discussed with: Patient   Blood Products: Consented (ALL Blood Products)         44yo healthy female with endometrial hyperplasia here for laparoscopic hysterectomy.  ASA 1.  Plan: GETA  Risks of anesthesia discussed, " including sore throat, PONV and risk of dental or lip trauma.    Dana Stephens MD  Staff Anesthesiologist  Pager [phone]                  Dana Stephens MD

## 2018-11-21 NOTE — IP AVS SNAPSHOT
Same Day Surgery [address] [address]    Phone:  [phone]                                       After Visit Summary   11/21/2018    Sara Hunter    MRN: [id number]           After Visit Summary Signature Page     I have received my discharge instructions, and my questions have been answered. I have discussed any challenges I see with this plan with the nurse or doctor.    ..........................................................................................................................................  Patient/Patient Representative Signature      ..........................................................................................................................................  Patient Representative Print Name and Relationship to Patient    ..................................................               ................................................  Date                                   Time    ..........................................................................................................................................  Reviewed by Signature/Title    ...................................................              ..............................................  Date                                               Time          22EPIC Rev 08/18

## 2018-11-21 NOTE — ANESTHESIA CARE TRANSFER NOTE
Patient: Sara Hunter    Procedure(s):  Laparoscopic Total Hysterectomy, Bilateral Salpingectomy ,Cystoscopy  Possible Open Total Abdominal Hysterectomy, Bilateral Salpingo-Oophorectomy, Lymph Node Dissection    Diagnosis: Complex Atypical Endometrial Hyperplasia   Diagnosis Additional Information: No value filed.    Anesthesia Type:   General     Note:  Airway :Nasal Cannula  Patient transferred to:PACU  Handoff Report: Identifed the Patient, Identified the Reponsible Provider, Reviewed the pertinent medical history, Discussed the surgical course, Reviewed Intra-OP anesthesia mangement and issues during anesthesia, Set expectations for post-procedure period and Allowed opportunity for questions and acknowledgement of understanding      Vitals: (Last set prior to Anesthesia Care Transfer)    CRNA VITALS  11/21/2018 0924 - 11/21/2018 0958      11/21/2018             Resp Rate (observed): 16                Electronically Signed By: ANAYA Christie CRNA  November 21, 2018  9:58 AM

## 2018-11-21 NOTE — OR NURSING
Called Dr. Queen, anesthesiologist for a sign out. Per anesthesia okay to send pt to phase 2, she will complete sign out.

## 2018-11-21 NOTE — PROGRESS NOTES
"Post-Op Check      Sara Hunter is a 43 year old F, POD#0 s/p total laparoscopic hysterectomy, bilateral salpingectomy, cystoscopy, proctoscopy    S: Pt doing well with pain well-controlled with ibuprofen, tylenol and PRN oxycodone. She has been ambulating, voiding spontaneously, and ate toast with peanut butter. Denies any nausea, shortness of breath, lightheadedness, and chest pain.     O:    /81  Temp 99  F (37.2  C) (Axillary)  Resp 18  Ht 1.6 m (5' 2.99\")  Wt 56.2 kg (123 lb 14.4 oz)  LMP 11/13/2018  SpO2 98%  BMI 21.95 kg/m2       General:  A&Ox3, NAD  CV:  RRR, no m/r/g  Pulm:  CTAB, good inspiratory effort  Abd: soft, appropriately tender to palpation, nondistended.  No rebound or guarding  Incision: laparoscopic incisions with overlying dressing, no serosanguinous drainage.  LE: no edema, no calf tenderness    A:  43 year old F, POD#0 laproscopic total hysterectomy, bilateral salpingectomy, cystoscopy. Frozen benign. Evidence of endometriosis on laparoscopy. She is doing well in the immediate postoperative period and meeting goals for discharge.   - Reviewed operative findings and activity restrictions  - Post-operative visit with Dr. Wilson on 12/10/18    Navjot Cm MD  OB/GYN Resident, PGY-3  11/21/2018       "

## 2018-11-21 NOTE — IP AVS SNAPSHOT
MRN:[id number]                      After Visit Summary   11/21/2018    Sara Hunter    MRN: [id number]           Thank you!     Thank you for choosing [facility] for your care. Our goal is always to provide you with excellent care. Hearing back from our patients is one way we can continue to improve our services. Please take a few minutes to complete the written survey that you may receive in the mail after you visit with us. Thank you!        Patient Information     Date Of Birth          1975        About your hospital stay     You were admitted on:  November 21, 2018 You last received care in the:  Same Day Surgery [facility]    You were discharged on:  November 21, 2018       Who to Call     For medical emergencies, please call 911.  For non-urgent questions about your medical care, please call your primary care provider or clinic, [phone]  For questions related to your surgery, please call your surgery clinic        Attending Provider     Provider Isidro Edwards MD Oncology       Primary Care Provider Office Phone # Fax #    Zaheer Otoole [phone]      After Care Instructions     Discharge Instructions       Resume pre procedure diet            Discharge Instructions       Nothing in vagina for 4-6 weeks.     GENERAL POST-OPERATIVE PATIENT INSTRUCTIONS FOLLOW-UP:     If you have any of the following, call the gynecology oncology office (if the clinic is closed, you can call the hospital at [phone] and ask for the GYN Oncology resident on call):   - Temperature greater than 100.4  - Persistent nausea and vomiting   - Severe uncontrolled pain   - Redness, tenderness, or signs of infection (pain, swelling, redness, odor or green/yellow discharge around the site)   - Difficulty breathing, headache or visual disturbances   - Hives   - Persistent dizziness or light-headedness   - Extreme fatigue     In an emergency, call 911 or  go to an Emergency Department at a nearby hospital        WOUND CARE INSTRUCTIONS:  Keep a dry clean dressing on the wound if there is drainage. If you had a bandage initially, it may be removed after 24 hours.  Once the wound has quit draining you may leave it open to air.  If clothing rubs against the wound or causes irritation and the wound is not draining you may cover it with a dry dressing during the daytime.  Try to keep the wound dry and avoid ointments on the wound unless directed to do so.  If the wound becomes bright red and painful or starts to drain infected material that is not clear, please contact your physician immediately.     1.  You may shower 24 hrs after surgery   2.  No soaking in the tub for 4 weeks      DIET:  There are no dietary restrictions.  You may eat any foods that you can tolerate unless instructed otherwise.  It is a good idea to eat a high fiber diet and take in plenty of fluids to prevent constipation.  If you become constipated, please follow the instructions below.      ACTIVITY:  You are encouraged to cough, deep breath and use your incentive spirometer if you were given one, every 15-30 minutes when awake.  This will help prevent respiratory complications and low grade fevers post-operatively.  You may want to hug a pillow when coughing and sneezing to add additional support to the surgical area, if you had abdominal surgery, which will decrease pain during these times.      1.  No heavy lifting >20lbs or strenuous exercise for six-eight weeks.  No exercise in which you are using core muscles (yoga, pilates, swimming, weight lifting)   2.  You may walk as much as you wish.  You are encouraged to increase your activity each day after surgery.  Stairs are okay.       MEDICATIONS:  Try to take narcotic medications and anti-inflammatory medications, such as tylenol, ibuprofen, naprosyn, etc., with food.  This will minimize stomach upset from the medication.  Should you develop  nausea and vomiting from the pain medication, or develop a rash, please discontinue the medication and contact your physician.  You should not drive, make important decisions, or operate machinery when taking narcotic pain medication.      OTHER:  Patients are often constipated after general anesthesia and surgery. You should continue to take stool softeners (for example, Senokot-S) for the next six weeks (unless diarrhea develops) and consume adequate amounts of water.  If the you remain constipated or unable to pass stool, please try one or all of the following measures: 1.  Milk of Magnesia 30cc twice a day as needed by mouth 2.  Metamucil 2 tablespoons in 12 ounces of fluid 3.  Dulcolax oral or suppositories 4.  Prunes or prune juice 5.  Miralax daily   QUESTIONS:  Please feel free to call your physician or the hospital  if you have any questions, and they will be glad to assist you.            Dressing       Keep dressing clean and dry, change as instructed by Provider or RN. May remove top dressing on 11/22. Keep steri-strips (thin tape over incisions) in place for 1 week.            No driving or operating machinery       No driving or operating machinery until day after procedure. No driving while taking narcotics            No lifting       No lifting over 15 pounds and no strenuous physical activity for 4 weeks            Shower        Shower on Post-op day 1.   DO NOT take a bath                  Your next 10 appointments already scheduled     Dec 10, 2018  9:20 AM CST   (Arrive by 9:05 AM)   Return Visit with Isidro Wilson MD   [facility]    [address] [place] [address]  [address] [place] [address] [phone]              Further instructions from your care team       [facility]  Same-Day Surgery   Adult Discharge Orders & Instructions     For 24 hours after surgery    1. Get plenty of  rest.  A responsible adult must stay with you for at least 24 hours after you leave the hospital.   2. Do not drive or use heavy equipment.  If you have weakness or tingling, don't drive or use heavy equipment until this feeling goes away.  3. Do not drink alcohol.  4. Avoid strenuous or risky activities.  Ask for help when climbing stairs.   5. You may feel lightheaded.  IF so, sit for a few minutes before standing.  Have someone help you get up.   6. If you have nausea (feel sick to your stomach): Drink only clear liquids such as apple juice, ginger ale, broth or 7-Up.  Rest may also help.  Be sure to drink enough fluids.  Move to a regular diet as you feel able.  7. You may have a slight fever. Call the doctor if your fever is over 100 F (37.7 C) (taken under the tongue) or lasts longer than 24 hours.  8. You may have a dry mouth, a sore throat, muscle aches or trouble sleeping.  These should go away after 24 hours.  9. Do not make important or legal decisions.   Call your doctor for any of the followin.  Signs of infection (fever, growing tenderness at the surgery site, a large amount of drainage or bleeding, severe pain, foul-smelling drainage, redness, swelling).    2. It has been over 8 to 10 hours since surgery and you are still not able to urinate (pass water).    3.  Headache for over 24 hours.    To contact a doctor, call Dr Wilson's office at [phone] at the Women's Health/Gynecologic Clinic or:        [phone] and ask for the resident on call for OB/GYN (answered 24 hours a day)      Emergency Department:    [facility]: [phone]       (TTY for hearing impaired: [phone])    [facility]: [phone]       (TTY for hearing impaired: [phone])        Additional Information     If you use hormonal birth control (such as the pill, patch, ring or implants): You'll need a second form of birth control for 7 days (condoms, a diaphragm or contraceptive foam). While in the  "hospital, you received a medicine called Bridion. Your normal birth control will not work as well for a week after taking this medicine.          Pending Results     Date and Time Order Name Status Description    11/21/2018 0835 Surgical pathology exam Preliminary     11/21/2018 0815 Cytology non gyn In process     11/21/2018 0539 POC US GUIDANCE NEEDLE PLACEMENT In process             Admission Information     Date & Time Provider Department Dept. Phone    11/21/2018 Isidro Wilson MD Same Day Surgery [facility] [phone]      Your Vitals Were     Blood Pressure Temperature Respirations Height Weight Last Period    124/72 99  F (37.2  C) (Axillary) 18 1.6 m (5' 2.99\") 56.2 kg (123 lb 14.4 oz) 11/13/2018    Pulse Oximetry BMI (Body Mass Index)                98% 21.95 kg/m2          MyChart Information     [employer] gives you secure access to your electronic health record. If you see a primary care provider, you can also send messages to your care team and make appointments. If you have questions, please call your primary care clinic.  If you do not have a primary care provider, please call [phone] and they will assist you.        Care EveryWhere ID     This is your Care EveryWhere ID. This could be used by other organizations to access your [facility] medical records  [id number]        Equal Access to Services     SG AGUILAR : Santiago Bright, waaxda luqadaha, qaybta kaalmada adejhonyyada, juany sarkar. So [facility] [phone].    ATENCIÓN: Si habla español, tiene a gonzalez disposición servicios gratdevinos de asistencia lingüística. Llame al [phone].    We comply with applicable federal civil rights laws and [place] laws. We do not discriminate on the basis of race, color, national origin, age, disability, sex, sexual orientation, or gender identity.               Review of your medicines      START taking        Dose / Directions    ibuprofen 600 MG " tablet   Commonly known as:  ADVIL/MOTRIN   Used for:  S/P laparoscopic hysterectomy        Dose:  600 mg   Take 1 tablet (600 mg) by mouth every 6 hours as needed for moderate pain   Quantity:  60 tablet   Refills:  0       oxyCODONE 5 MG tablet   Commonly known as:  ROXICODONE   Used for:  S/P laparoscopic hysterectomy        Dose:  5 mg   Take 1 tablet (5 mg) by mouth every 6 hours as needed for severe pain   Quantity:  15 tablet   Refills:  0       senna-docusate 8.6-50 MG per tablet   Commonly known as:  SENOKOT-S;PERICOLACE   Used for:  S/P laparoscopic hysterectomy, Constipation due to opioid therapy        Dose:  1-2 tablet   Take 1-2 tablets by mouth 2 times daily   Quantity:  60 tablet   Refills:  0         CONTINUE these medicines which may have CHANGED, or have new prescriptions. If we are uncertain of the size of tablets/capsules you have at home, strength may be listed as something that might have changed.        Dose / Directions    * TYLENOL PO   This may have changed:  Another medication with the same name was added. Make sure you understand how and when to take each.        Dose:  500 mg   Take 500 mg by mouth every 4 hours as needed for mild pain or fever   Refills:  0       * acetaminophen 325 MG tablet   Commonly known as:  TYLENOL   This may have changed:  You were already taking a medication with the same name, and this prescription was added. Make sure you understand how and when to take each.   Used for:  S/P laparoscopic hysterectomy        Dose:  650 mg   Take 2 tablets (650 mg) by mouth every 6 hours as needed for mild pain   Quantity:  60 tablet   Refills:  0       * Notice:  This list has 2 medication(s) that are the same as other medications prescribed for you. Read the directions carefully, and ask your doctor or other care provider to review them with you.      CONTINUE these medicines which have NOT CHANGED        Dose / Directions    ASPIRIN ADULT LOW STRENGTH PO        Dose:  81  mg   Take 81 mg by mouth as needed for fever   Refills:  0         STOP taking     ALEVE PO                Where to get your medicines      These medications were sent to [facility] - [place], [place] - [address]  [address], [facility] [address]     Phone:  [phone]     acetaminophen 325 MG tablet    ibuprofen 600 MG tablet    senna-docusate 8.6-50 MG per tablet         Some of these will need a paper prescription and others can be bought over the counter. Ask your nurse if you have questions.     Bring a paper prescription for each of these medications     oxyCODONE 5 MG tablet                Protect others around you: Learn how to safely use, store and throw away your medicines at www.disposemymeds.org.        Information about OPIOIDS     PRESCRIPTION OPIOIDS: WHAT YOU NEED TO KNOW   We gave you an opioid (narcotic) pain medicine. It is important to manage your pain, but opioids are not always the best choice. You should first try all the other options your care team gave you. Take this medicine for as short a time (and as few doses) as possible.    Some activities can increase your pain, such as bandage changes or therapy sessions. It may help to take your pain medicine 30 to 60 minutes before these activities. Reduce your stress by getting enough sleep, working on hobbies you enjoy and practicing relaxation or meditation. Talk to your care team about ways to manage your pain beyond prescription opioids.    These medicines have risks:    DO NOT drive when on new or higher doses of pain medicine. These medicines can affect your alertness and reaction times, and you could be arrested for driving under the influence (DUI). If you need to use opioids long-term, talk to your care team about driving.    DO NOT operate heavy machinery    DO NOT do any other dangerous activities while taking these medicines.    DO NOT drink any alcohol while taking these medicines.     If the  opioid prescribed includes acetaminophen, DO NOT take with any other medicines that contain acetaminophen. Read all labels carefully. Look for the word  acetaminophen  or  Tylenol.  Ask your pharmacist if you have questions or are unsure.    You can get addicted to pain medicines, especially if you have a history of addiction (chemical, alcohol or substance dependence). Talk to your care team about ways to reduce this risk.    All opioids tend to cause constipation. Drink plenty of water and eat foods that have a lot of fiber, such as fruits, vegetables, prune juice, apple juice and high-fiber cereal. Take a laxative (Miralax, milk of magnesia, Colace, Senna) if you don t move your bowels at least every other day. Other side effects include upset stomach, sleepiness, dizziness, throwing up, tolerance (needing more of the medicine to have the same effect), physical dependence and slowed breathing.    Store your pills in a secure place, locked if possible. We will not replace any lost or stolen medicine. If you don t finish your medicine, please throw away (dispose) as directed by your pharmacist. The [place] Pollution [employer] Agency has more information about safe disposal: https://www.pca.[facility].[place]/living-green/managing-unwanted-medications             Medication List: This is a list of all your medications and when to take them. Check marks below indicate your daily home schedule. Keep this list as a reference.      Medications           Morning Afternoon Evening Bedtime As Needed    ASPIRIN ADULT LOW STRENGTH PO   Take 81 mg by mouth as needed for fever                                ibuprofen 600 MG tablet   Commonly known as:  ADVIL/MOTRIN   Take 1 tablet (600 mg) by mouth every 6 hours as needed for moderate pain                                oxyCODONE 5 MG tablet   Commonly known as:  ROXICODONE   Take 1 tablet (5 mg) by mouth every 6 hours as needed for severe pain                                 senna-docusate 8.6-50 MG per tablet   Commonly known as:  SENOKOT-S;PERICOLACE   Take 1-2 tablets by mouth 2 times daily                                * TYLENOL PO   Take 500 mg by mouth every 4 hours as needed for mild pain or fever                                * acetaminophen 325 MG tablet   Commonly known as:  TYLENOL   Take 2 tablets (650 mg) by mouth every 6 hours as needed for mild pain                                * Notice:  This list has 2 medication(s) that are the same as other medications prescribed for you. Read the directions carefully, and ask your doctor or other care provider to review them with you.

## 2018-11-21 NOTE — OP NOTE
[facility]  Full Operative Note    Date of Service:  11/21/2018    Surgeon: Isidro Wilson MD  Assistants: Navjot Cm MD, PGY-3      Preop Dx: 1. Complex atypical hyperplasia    2. Fibroid uterus  Postop Dx: 1. Complex atypical hyperplasia    2. Fibroid uterus    3. Endometriosis  Procedure: Total laparoscopic hysterectomy, bilateral salpingectomy, cytoscopy, proctoscopy    Anesthesia: GETA  IVF: 800 mL crystalloid  EBL: 10 mL  UOP: 60 mL pyridium-stained urine at the end of the case  Drains: none  Specimens: Uterus, cervix, bilateral fallopian tubes  Complications: None apparent    Indications: Ms. Sara Hunter is a 43 year old with complex atypical hyperplasia who desired definitive surgical management. The risks, benefits, and alternatives to the procedure were discussed and she agreed to proceed.    Findings:   1. Laparoscopy: Endometriosis powder burn lesions along bladder peritoneum, adhesion of left uterine cornua, fallopian tube and ovary, and bowel to left pelvic side wall endometrial implant. Enlarged, boggy uterus with ~2-3 cm posterior subserosal fibroid. Survey of abdomen revealed normal appearing liver capsule and edge, diaphragm and greater curvature of stomach. Frozen of specimen was benign. Vaginal cuff and surgical sites hemostatic at end of case.  2. Cytoscopy: brisk efflux seen from bilateral ureteral orifices. Survey of bladder revealed no defects or sutures.  3. Proctoscopy: negative bubble air leak test.    Procedure:  After obtaining informed consent, Ms. Vital was brought to the operating room where adequate general anesthesia was administered.  She was placed in the dorsal lithotomy position, and exam under anesthesia revealed the findings noted above.  Srepped and draped in the usual sterile fashion, and traylor catheter was placed. The umbilicus was everted, and a 5 mm stab incision was made. The Veress needle was placed, and intraperitoneal placement was  suggested by the water drop test and an opening pressure of less than 5 mmHg.  The abdomen was then insufflated to a maximum pressure of 15 mmHg.  The Veress needle was removed and a 5 mm trocar was placed.  The laparoscope was placed, and survey of the abdomen revealed the findings noted above. No trauma from entry was noted. There was no overt evidence of metastatic disease.  Attention was turned to the left lower quadrant. At a point 2 cm superomedial to the ASIS, a 12 mm incision was made, and a 12 mm trocar was placed under direct visualization.  Attention was then turned to the right lower quadrant. Similarly, at a point 2 cm superomedial to the right ASIS, a 5 mm incision was made, and a 5 mm trocar was placed under direct visualization.  The patient was placed in steep Trendelenburg position.    Attention was turned to the vagina.  The cervix was visualized and serially dilated without difficulty.  A KOH ring and KUMAR uterine manipulator were placed.      At this point, the right ureter was identified transperitoneally, and noted to peristalse. The right mesosalpinx was transected with the Ligasure,  the fallopian tube from the ovary. The infundibulopelvic ligament was identified and carefully avoided. Next the uteroovarian ligament was taken down with Ligasure. The left ureter was then similarly identified and noted to peristalse. On the left pelvic side wall, bowel, ovarian and fallopian tube adhesion to likely endometrial implant was taken down carefully sharply and bluntly. The left mesosalpinx was transected, taking care to avoid the IP ligament. Next the uteroovarian ligament was taken down with Ligasure. A bladder flap was then created by dissecting the vesicouterine peritoneum. The right uterine arteries were then skeletonized and transected with the Ligasure. The left uterine arteries were similarly ligated. The uterus was then amputated at the level of the vaginal cuff along the KOH ring,  and the specimen was removed through the vagina. The vaginal cuff was then closed using 2-0 Vicryl suture in a series of interrupted [id number] using the EndoStitch device. At this time, frozen of uterus, cervix and bilateral fallopian tube specimen returned as benign.    The traylor catheter was then removed and the cystoscope was placed in the bladder. Efflux was noted from bilateral ureteral orifices. A complete survey of the bladder revealed no injury. Next proctoscopy was performed with negative air leak test.     The 12 mm fascial incision was closed with an 0 Vicryl suture.  All skin incisions were closed using 4-0 Monocryl, and covered with steri -strips and a sterile dressing.    All sponge, needle, and instrument counts were correct. The patient tolerated the procedure well and was transferred to recovery in stable condition. Dr. Wilson was present and scrubbed for the entire procedure.    Navjot Cm MD  OB/GYN Resident, PGY-3  11/21/2018

## 2018-11-21 NOTE — ANESTHESIA POSTPROCEDURE EVALUATION
Anesthesia POST Procedure Evaluation    Patient: Sara Hunter   MRN:     [id number] Gender:   female   Age:    43 year old :      1975        Preoperative Diagnosis: Complex Atypical Endometrial Hyperplasia    Procedure(s):  Laparoscopic Total Hysterectomy, Bilateral Salpingectomy ,Cystoscopy   Postop Comments: No value filed.       Anesthesia Type:  General    Reportable Event: NO     PAIN: Uncomplicated   Sign Out status: Comfortable, Well controlled pain     PONV: No PONV   Sign Out status:  No Nausea or Vomiting     Neuro/Psych: Uneventful perioperative course   Sign Out Status: Preoperative baseline     Airway/Resp.: Uneventful perioperative course   Sign Out Status: Resp. Status within EXPECTED Parameters     CV: Uneventful perioperative course   Sign Out status: Appropriate BP and perfusion indices; Appropriate HR/Rhythm     Disposition:   Sign Out in:  PACU  Recovery Course: Uneventful  Follow-Up: Not required           Last Anesthesia Record Vitals:  CRNA VITALS  2018 0924 - 2018 1024      2018             Resp Rate (observed): 16    EKG: NSR          Last PACU/Preop Vitals:  Vitals:    18 1115 18 1130 18 1153   BP: 126/68 127/74 124/72   Resp:    Temp:   37.2  C (99  F)   SpO2: 100% 97% 98%         Electronically Signed By: Magda Queen MD, 2018, 12:28 PM

## 2018-11-23 ENCOUNTER — CARE COORDINATION (OUTPATIENT)
Dept: ONCOLOGY | Facility: CLINIC | Age: 43
End: 2018-11-23

## 2018-11-23 LAB — COPATH REPORT: NORMAL

## 2018-11-29 LAB — COPATH REPORT: NORMAL

## 2018-12-02 LAB — COPATH REPORT: NORMAL

## 2018-12-09 NOTE — PROGRESS NOTES
Consult Notes on Referred Patient       Dr. Lashell Lopes MD  [facility]  [address] [id number]       RE: Sara Hunter  : 1975  SUMI: 12/10/2018     Dear Dr. Lashell Lopes:    I had the pleasure of seeing your patient Sara Spangler here at the Gynecologic Cancer Clinic at the [facility] on 2018.  As you know she is a very pleasant 43 year old woman with a recent diagnosis of  Endometrial hyperplasia.  Given these findings she was subsequently sent to the Gynecologic Cancer Clinic for new patient consultation.     She was recently evaluated by Dr. Lashell Lopes for irregular vaginal bleeding in the setting of knwn fibroids.  HEr symptoms were characterized by menorrhagia and intra-menstrual spotting.  She also has bowel and bledder pressure.    We have discussed options including medical and surgical management. After a comprehensive discussion of the relative risks and benefits of each strategy she is inclined to surgery, which was reasonable.  She underwent TLH/BS and washings which demonstrated:    SPECIMEN(S):   A: No specimen rec'd from OR for A   B: Uterus, cervix, bilateral fallopian tubes     FINAL DIAGNOSIS:   A. Washings: No evidence of malignancy    B. UTERUS, CERVIX, BILATERAL FALLOPIAN TUBES:   - Focal atypical endometrial hyperplasia   - Bilateral fallopian tubes with epithelial hyperplasia   - Leiomyomas, largest 5.2 cm in greatest dimension   - Unremarkable cervix   - Negative for malignancy       Review of Systems:    See below    Past Medical History:    Past Medical History:   Diagnosis Date     Abnormal Pap smear of cervix      Fibroids          Past Surgical History:    Past Surgical History:   Procedure Laterality Date     COLONOSCOPY       GYN SURGERY      lap      HYSTEROSCOPY       LAPAROSCOPIC HYSTERECTOMY TOTAL, BILATERAL SALPINGO-OOPHORECTOMY, COMBINED N/A 2018    Procedure:  "Laparoscopic Total Hysterectomy, Bilateral Salpingectomy ,Cystoscopy;  Surgeon: Isidro Wilson MD;  Location: [facility] OR     LAPAROSCOPY      for ovarian cysts         Health Maintenance:  Health Maintenance Due   Topic Date Due     DTAP/TDAP/TD IMMUNIZATION (1 - Tdap) 11/14/1982     PHQ-2 Q1 YR  11/14/1987     HIV SCREEN (SYSTEM ASSIGNED)  11/14/1993     PNEUMOVAX IMMUNIZATION 19-64 HIGHEST RISK (1 of 3 - PCV13) 11/14/1994     PAP SCREENING Q3 YR (SYSTEM ASSIGNED)  11/14/1996     INFLUENZA VACCINE (1) 09/01/2018       Last Pap Smear: 10/2018    Last Mammogram: 6/2016 normal    Last Colonoscopy: never                Current Medications:     has a current medication list which includes the following prescription(s): acetaminophen, acetaminophen, aspirin, ibuprofen, oxycodone, and senna-docusate.       Allergies:        Allergies   Allergen Reactions     Clindamycin      Rash       Erythromycin      vomiting              Social History:     Social History     Tobacco Use     Smoking status: Former Smoker     Types: Cigarettes     Start date: 11/9/1995     Last attempt to quit: 11/9/2003     Years since quitting: 15.0     Smokeless tobacco: Never Used   Substance Use Topics     Alcohol use: No       History   Drug Use No           Physical Exam:     PS: 0  VS:  /81   Pulse 76   Temp 97.9  F (36.6  C) (Oral)   Resp 16   Ht 1.6 m (5' 3\")   Wt 57.6 kg (127 lb)   LMP 11/13/2018   SpO2 100%   Breastfeeding? No   BMI 22.50 kg/m        HEENT:  no thyromegaly, no palpable nodules or masses     Musculoskeletal: extremities non tender and without edema    Skin: no lesions or rashes     Neurological: normal gait, no gross defects     Psychiatric: appropriate mood and affect                               Hematological: normal cervical, supraclavicular and inguinal lymph nodes     Gastrointestinal:       abdomen soft, non-tender, non-distended, no organomegaly or masses    Genitourinary: " deferred      Assessment:    Sara Hunter is a  woman with a new diagnosis of CAH.     A total of 25 minutes was spent with the patient, 15 minutes of which were spent in counseling the patient and/or treatment planning.      Plan:     1.)  CAH: we have discussed the clinical and pathologic findings.  I have recommended no additional follow-up at this time.  We have discussed wound care and reason to return for additional follow-up.  I have answered her questions to the best of my ability and she appears to have a good understanding of her condition.    2.) Genetic risk factors were assessed and the patient does not meet the qualifications for a referral.      3.) Labs and/or tests ordered include:  none     4.) Health maintenance issues addressed today include none.    5.) Pre-op teaching was completed today.  Risks of surgery were discussed to include: bleeding, transfusion, infection, unintentional injury to surrounding organs/structures.      Thank you for allowing us to participate in the care of your patient.         Sincerely,    Isidro Wilson      Patient Care Team:  Zaheer Otoole MD as PCP - General (Family Practice)  ARABELLA CARDENAS    Answers for HPI/ROS submitted by the patient on 11/8/2018   General Symptoms: No  Skin Symptoms: No  HENT Symptoms: No  EYE SYMPTOMS: No  HEART SYMPTOMS: No  LUNG SYMPTOMS: No  INTESTINAL SYMPTOMS: No  URINARY SYMPTOMS: Yes  GYNECOLOGIC SYMPTOMS: Yes  BREAST SYMPTOMS: No  SKELETAL SYMPTOMS: No  BLOOD SYMPTOMS: No  NERVOUS SYSTEM SYMPTOMS: No  MENTAL HEALTH SYMPTOMS: No  Trouble holding urine or incontinence: No  Pain or burning: No  Trouble starting or stopping: No  Increased frequency of urination: No  Frequent nighttime urination: Yes  Flank pain: No  Difficulty emptying bladder: Yes  Bleeding or spotting between periods: Yes  Heavy or painful periods: Yes  Irregular periods: No  Vaginal discharge: Yes  Hot flashes: No  Vaginal dryness:  No  Genital ulcers: No  Reduced libido: Yes  Painful intercourse: Yes  Difficulty with sexual arousal: No  Post-menopausal bleeding: No    Answers for HPI/ROS submitted by the patient on 12/10/2018   General Symptoms: No  Skin Symptoms: No  HENT Symptoms: No  EYE SYMPTOMS: No  HEART SYMPTOMS: No  LUNG SYMPTOMS: No  INTESTINAL SYMPTOMS: No  URINARY SYMPTOMS: No  GYNECOLOGIC SYMPTOMS: No  BREAST SYMPTOMS: No  SKELETAL SYMPTOMS: No  BLOOD SYMPTOMS: No  NERVOUS SYSTEM SYMPTOMS: No  MENTAL HEALTH SYMPTOMS: No

## 2018-12-10 ENCOUNTER — ONCOLOGY VISIT (OUTPATIENT)
Dept: ONCOLOGY | Facility: CLINIC | Age: 43
End: 2018-12-10
Attending: OBSTETRICS & GYNECOLOGY

## 2018-12-10 VITALS
DIASTOLIC BLOOD PRESSURE: 81 MMHG | RESPIRATION RATE: 16 BRPM | OXYGEN SATURATION: 100 % | HEIGHT: 63 IN | SYSTOLIC BLOOD PRESSURE: 132 MMHG | WEIGHT: 127 LBS | HEART RATE: 76 BPM | TEMPERATURE: 97.9 F | BODY MASS INDEX: 22.5 KG/M2

## 2018-12-10 DIAGNOSIS — N85.02 COMPLEX ATYPICAL ENDOMETRIAL HYPERPLASIA: Primary | ICD-10-CM

## 2018-12-10 PROCEDURE — 99024 POSTOP FOLLOW-UP VISIT: CPT | Mod: ZP | Performed by: OBSTETRICS & GYNECOLOGY

## 2018-12-10 PROCEDURE — G0463 HOSPITAL OUTPT CLINIC VISIT: HCPCS | Mod: ZF

## 2018-12-10 ASSESSMENT — PAIN SCALES - GENERAL: PAINLEVEL: NO PAIN (0)

## 2018-12-10 ASSESSMENT — MIFFLIN-ST. JEOR: SCORE: 1200.2

## 2018-12-10 NOTE — LETTER
12/10/2018       RE: Sara Hunter  [address] [address]     Dear Colleague,    Thank you for referring your patient, Sara Hunter, to the [facility]. Please see a copy of my visit note below.                            Consult Notes on Referred Patient       Dr. Lashell Lopes MD  [facility]  [address]  [place] [address]       RE: Sara Hunter  : 1975  SUMI: 12/10/2018     Dear Dr. Lashell Lopes:    I had the pleasure of seeing your patient Sara Spangler here at the Gynecologic Cancer Clinic at the [facility] on 2018.  As you know she is a very pleasant 43 year old woman with a recent diagnosis of  Endometrial hyperplasia.  Given these findings she was subsequently sent to the Gynecologic Cancer Clinic for new patient consultation.     She was recently evaluated by Dr. Lashell Lopes for irregular vaginal bleeding in the setting of knwn fibroids.  HEr symptoms were characterized by menorrhagia and intra-menstrual spotting.  She also has bowel and bledder pressure.    We have discussed options including medical and surgical management. After a comprehensive discussion of the relative risks and benefits of each strategy she is inclined to surgery, which was reasonable.  She underwent TLH/BS and washings which demonstrated:    SPECIMEN(S):   A: No specimen rec'd from OR for A   B: Uterus, cervix, bilateral fallopian tubes     FINAL DIAGNOSIS:   A. Washings: No evidence of malignancy    B. UTERUS, CERVIX, BILATERAL FALLOPIAN TUBES:   - Focal atypical endometrial hyperplasia   - Bilateral fallopian tubes with epithelial hyperplasia   - Leiomyomas, largest 5.2 cm in greatest dimension   - Unremarkable cervix   - Negative for malignancy       Review of Systems:    See below    Past Medical History:    Past Medical History:   Diagnosis Date     Abnormal Pap smear of cervix      Fibroids          Past  "Surgical History:    Past Surgical History:   Procedure Laterality Date     COLONOSCOPY       GYN SURGERY      lap      HYSTEROSCOPY       LAPAROSCOPIC HYSTERECTOMY TOTAL, BILATERAL SALPINGO-OOPHORECTOMY, COMBINED N/A 11/21/2018    Procedure: Laparoscopic Total Hysterectomy, Bilateral Salpingectomy ,Cystoscopy;  Surgeon: Isidro Wilson MD;  Location: [facility] OR     LAPAROSCOPY      for ovarian cysts         Health Maintenance:  Health Maintenance Due   Topic Date Due     DTAP/TDAP/TD IMMUNIZATION (1 - Tdap) 11/14/1982     PHQ-2 Q1 YR  11/14/1987     HIV SCREEN (SYSTEM ASSIGNED)  11/14/1993     PNEUMOVAX IMMUNIZATION 19-64 HIGHEST RISK (1 of 3 - PCV13) 11/14/1994     PAP SCREENING Q3 YR (SYSTEM ASSIGNED)  11/14/1996     INFLUENZA VACCINE (1) 09/01/2018       Last Pap Smear: 10/2018    Last Mammogram: 6/2016 normal    Last Colonoscopy: never                Current Medications:     has a current medication list which includes the following prescription(s): acetaminophen, acetaminophen, aspirin, ibuprofen, oxycodone, and senna-docusate.       Allergies:        Allergies   Allergen Reactions     Clindamycin      Rash       Erythromycin      vomiting              Social History:     Social History     Tobacco Use     Smoking status: Former Smoker     Types: Cigarettes     Start date: 11/9/1995     Last attempt to quit: 11/9/2003     Years since quitting: 15.0     Smokeless tobacco: Never Used   Substance Use Topics     Alcohol use: No       History   Drug Use No           Physical Exam:     PS: 0  VS:  /81   Pulse 76   Temp 97.9  F (36.6  C) (Oral)   Resp 16   Ht 1.6 m (5' 3\")   Wt 57.6 kg (127 lb)   LMP 11/13/2018   SpO2 100%   Breastfeeding? No   BMI 22.50 kg/m         HEENT:  no thyromegaly, no palpable nodules or masses     Musculoskeletal: extremities non tender and without edema    Skin: no lesions or rashes     Neurological: normal gait, no gross defects     Psychiatric: appropriate mood and " affect                               Hematological: normal cervical, supraclavicular and inguinal lymph nodes     Gastrointestinal:       abdomen soft, non-tender, non-distended, no organomegaly or masses    Genitourinary: deferred      Assessment:    Sara Hunter is a  woman with a new diagnosis of CAH.     A total of 25 minutes was spent with the patient, 15 minutes of which were spent in counseling the patient and/or treatment planning.      Plan:     1.)  CAH: we have discussed the clinical and pathologic findings.  I have recommended no additional follow-up at this time.  We have discussed wound care and reason to return for additional follow-up.  I have answered her questions to the best of my ability and she appears to have a good understanding of her condition.    2.) Genetic risk factors were assessed and the patient does not meet the qualifications for a referral.      3.) Labs and/or tests ordered include:  none     4.) Health maintenance issues addressed today include none.    5.) Pre-op teaching was completed today.  Risks of surgery were discussed to include: bleeding, transfusion, infection, unintentional injury to surrounding organs/structures.      Thank you for allowing us to participate in the care of your patient.         Sincerely,    Isidro Wilson    CC  Patient Care Team:  Zaheer Otoole MD as PCP - General (Family Practice)  ARABELLA CARDENAS    Answers for HPI/ROS submitted by the patient on 11/8/2018   General Symptoms: No  Skin Symptoms: No  HENT Symptoms: No  EYE SYMPTOMS: No  HEART SYMPTOMS: No  LUNG SYMPTOMS: No  INTESTINAL SYMPTOMS: No  URINARY SYMPTOMS: Yes  GYNECOLOGIC SYMPTOMS: Yes  BREAST SYMPTOMS: No  SKELETAL SYMPTOMS: No  BLOOD SYMPTOMS: No  NERVOUS SYSTEM SYMPTOMS: No  MENTAL HEALTH SYMPTOMS: No  Trouble holding urine or incontinence: No  Pain or burning: No  Trouble starting or stopping: No  Increased frequency of urination: No  Frequent nighttime  urination: Yes  Flank pain: No  Difficulty emptying bladder: Yes  Bleeding or spotting between periods: Yes  Heavy or painful periods: Yes  Irregular periods: No  Vaginal discharge: Yes  Hot flashes: No  Vaginal dryness: No  Genital ulcers: No  Reduced libido: Yes  Painful intercourse: Yes  Difficulty with sexual arousal: No  Post-menopausal bleeding: No    Answers for HPI/ROS submitted by the patient on 12/10/2018   General Symptoms: No  Skin Symptoms: No  HENT Symptoms: No  EYE SYMPTOMS: No  HEART SYMPTOMS: No  LUNG SYMPTOMS: No  INTESTINAL SYMPTOMS: No  URINARY SYMPTOMS: No  GYNECOLOGIC SYMPTOMS: No  BREAST SYMPTOMS: No  SKELETAL SYMPTOMS: No  BLOOD SYMPTOMS: No  NERVOUS SYSTEM SYMPTOMS: No  MENTAL HEALTH SYMPTOMS: No      Again, thank you for allowing me to participate in the care of your patient.      Sincerely,    Isidro Wilson MD

## 2018-12-10 NOTE — NURSING NOTE
"Oncology Rooming Note    December 10, 2018 8:54 AM   Sara Hunter is a 43 year old female who presents for:    Chief Complaint   Patient presents with     Oncology Clinic Visit     Return Endometrial Hyperplasia     Initial Vitals: /81   Pulse 76   Temp 97.9  F (36.6  C) (Oral)   Resp 16   Ht 1.6 m (5' 3\")   Wt 57.6 kg (127 lb)   LMP 11/13/2018   SpO2 100%   Breastfeeding? No   BMI 22.50 kg/m   Estimated body mass index is 22.5 kg/m  as calculated from the following:    Height as of this encounter: 1.6 m (5' 3\").    Weight as of this encounter: 57.6 kg (127 lb). Body surface area is 1.6 meters squared.  No Pain (0) Comment: Data Unavailable   Patient's last menstrual period was 11/13/2018.  Allergies reviewed: Yes  Medications reviewed: Yes    Medications: Medication refills not needed today.  Pharmacy name entered into EPIC: Data Unavailable    Clinical concerns: no new concerns     6 minutes for nursing intake (face to face time)     Shereen Hoyos CMA              "

## 2019-04-03 NOTE — PROGRESS NOTES
Surgery date: 11/21  Post op visit:  12/10    Spoke with pt states feeling well, gas pains resolving  Pain   No real pain   Meds:  ibuprofen   Well controlled:  yes  Denies fever, chills, bowel/bladder issues, leg redness/swelling/tenderness, chest pain, SOB  Eating and drinking well, denies nausea/vomiting  Incision    Healthy: no signs of infection, \    Redness or drainage:  no   Shower:  yes  Staples:  na  Walking:  yes  Questions:  mp    Post op appt confirmed and patient will call if any questions or concerns

## 2020-03-11 ENCOUNTER — HEALTH MAINTENANCE LETTER (OUTPATIENT)
Age: 45
End: 2020-03-11

## 2021-01-03 ENCOUNTER — HEALTH MAINTENANCE LETTER (OUTPATIENT)
Age: 46
End: 2021-01-03

## 2021-03-07 ENCOUNTER — HEALTH MAINTENANCE LETTER (OUTPATIENT)
Age: 46
End: 2021-03-07

## 2021-03-31 ENCOUNTER — RECORDS - HEALTHEAST (OUTPATIENT)
Dept: LAB | Facility: CLINIC | Age: 46
End: 2021-03-31

## 2021-03-31 LAB
ALBUMIN SERPL-MCNC: 4.3 G/DL (ref 3.5–5)
ALP SERPL-CCNC: 43 U/L (ref 45–120)
ALT SERPL W P-5'-P-CCNC: 13 U/L (ref 0–45)
ANION GAP SERPL CALCULATED.3IONS-SCNC: 11 MMOL/L (ref 5–18)
AST SERPL W P-5'-P-CCNC: 13 U/L (ref 0–40)
BILIRUB SERPL-MCNC: 0.3 MG/DL (ref 0–1)
BUN SERPL-MCNC: 17 MG/DL (ref 8–22)
CALCIUM SERPL-MCNC: 9.1 MG/DL (ref 8.5–10.5)
CHLORIDE BLD-SCNC: 108 MMOL/L (ref 98–107)
CHOLEST SERPL-MCNC: 171 MG/DL
CO2 SERPL-SCNC: 21 MMOL/L (ref 22–31)
CREAT SERPL-MCNC: 0.82 MG/DL (ref 0.6–1.1)
FASTING STATUS PATIENT QL REPORTED: NORMAL
GFR SERPL CREATININE-BSD FRML MDRD: >60 ML/MIN/1.73M2
GLUCOSE BLD-MCNC: 88 MG/DL (ref 70–125)
HDLC SERPL-MCNC: 55 MG/DL
LDLC SERPL CALC-MCNC: 99 MG/DL
POTASSIUM BLD-SCNC: 4.5 MMOL/L (ref 3.5–5)
PROT SERPL-MCNC: 6.9 G/DL (ref 6–8)
SODIUM SERPL-SCNC: 140 MMOL/L (ref 136–145)
TRIGL SERPL-MCNC: 83 MG/DL
TSH SERPL DL<=0.005 MIU/L-ACNC: 1.86 UIU/ML (ref 0.3–5)

## 2021-04-25 ENCOUNTER — HEALTH MAINTENANCE LETTER (OUTPATIENT)
Age: 46
End: 2021-04-25

## 2021-05-25 ENCOUNTER — RECORDS - HEALTHEAST (OUTPATIENT)
Dept: ADMINISTRATIVE | Facility: CLINIC | Age: 46
End: 2021-05-25

## 2021-05-28 ENCOUNTER — RECORDS - HEALTHEAST (OUTPATIENT)
Dept: ADMINISTRATIVE | Facility: CLINIC | Age: 46
End: 2021-05-28

## 2021-05-31 ENCOUNTER — RECORDS - HEALTHEAST (OUTPATIENT)
Dept: ADMINISTRATIVE | Facility: CLINIC | Age: 46
End: 2021-05-31

## 2021-10-10 ENCOUNTER — HEALTH MAINTENANCE LETTER (OUTPATIENT)
Age: 46
End: 2021-10-10

## 2022-03-26 ENCOUNTER — HEALTH MAINTENANCE LETTER (OUTPATIENT)
Age: 47
End: 2022-03-26

## 2022-05-21 ENCOUNTER — HEALTH MAINTENANCE LETTER (OUTPATIENT)
Age: 47
End: 2022-05-21

## 2022-09-18 ENCOUNTER — HEALTH MAINTENANCE LETTER (OUTPATIENT)
Age: 47
End: 2022-09-18

## 2023-05-07 ENCOUNTER — HEALTH MAINTENANCE LETTER (OUTPATIENT)
Age: 48
End: 2023-05-07

## 2023-06-04 ENCOUNTER — HEALTH MAINTENANCE LETTER (OUTPATIENT)
Age: 48
End: 2023-06-04
